# Patient Record
Sex: FEMALE | Race: WHITE | NOT HISPANIC OR LATINO | Employment: STUDENT | URBAN - METROPOLITAN AREA
[De-identification: names, ages, dates, MRNs, and addresses within clinical notes are randomized per-mention and may not be internally consistent; named-entity substitution may affect disease eponyms.]

---
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
## 2024-01-15 ENCOUNTER — OFFICE VISIT (OUTPATIENT)
Dept: OBGYN CLINIC | Facility: CLINIC | Age: 19
End: 2024-01-15
Payer: COMMERCIAL

## 2024-01-15 VITALS — DIASTOLIC BLOOD PRESSURE: 60 MMHG | SYSTOLIC BLOOD PRESSURE: 90 MMHG | WEIGHT: 103 LBS

## 2024-01-15 DIAGNOSIS — Z11.3 SCREENING EXAMINATION FOR STD (SEXUALLY TRANSMITTED DISEASE): Primary | ICD-10-CM

## 2024-01-15 PROCEDURE — 99202 OFFICE O/P NEW SF 15 MIN: CPT | Performed by: NURSE PRACTITIONER

## 2024-01-15 NOTE — PROGRESS NOTES
Katarina Diaz 18-year-old presents for sexually transmitted disease check. Sexual history reviewed with the patient. STD exposure: denies knowledge of risky exposure.  Previous history of STD:  none. Current symptoms include none.  Contraception: condoms. She is in a stable relationship and just desires screening out of precaution.     Patient's last menstrual period was 01/05/2024.    Visit Vitals  Wt 46.7 kg (103 lb)   LMP 01/05/2024   OB Status Having periods   Smoking Status Never     ROS:  As indicated in HPI. All other ROS negative.     Physical Exam  Constitutional:       Appearance: Normal appearance.   Genitourinary:      No lesions in the vagina.      Right Labia: No rash, tenderness, lesions, skin changes or Bartholin's cyst.     Left Labia: No tenderness, lesions, skin changes, Bartholin's cyst or rash.     No labial fusion noted.      Vaginal discharge (scant thin white discharge) present.      No vaginal erythema, tenderness, bleeding, ulceration or granulation tissue.      No vaginal prolapse present.     No vaginal atrophy present.     Cervix is nulliparous.      No cervical discharge, friability or lesion.   HENT:      Head: Normocephalic and atraumatic.   Cardiovascular:      Rate and Rhythm: Normal rate.   Pulmonary:      Effort: Pulmonary effort is normal.   Musculoskeletal:      Cervical back: Neck supple.   Neurological:      Mental Status: She is alert.   Skin:     General: Skin is warm.   Vitals and nursing note reviewed.       Katarina was seen today for exposure to std.    Diagnoses and all orders for this visit:    Screening examination for STD (sexually transmitted disease)  -     SURESWAB(R) ADVANCED VAGINITIS PLUS, TMA      Cultures collected for STD screening.   Results will be released to [facility], if abnormal will call to review and discuss treatment plan.

## 2024-01-16 LAB
BV BACTERIA RRNA VAG QL NAA+PROBE: POSITIVE
C GLABRATA RNA VAG QL NAA+PROBE: NOT DETECTED
C TRACH RRNA SPEC QL NAA+PROBE: NOT DETECTED
CANDIDA RRNA VAG QL PROBE: NOT DETECTED
N GONORRHOEA RRNA SPEC QL NAA+PROBE: NOT DETECTED
T VAGINALIS RRNA SPEC QL NAA+PROBE: NOT DETECTED

## 2024-01-17 DIAGNOSIS — N76.0 BV (BACTERIAL VAGINOSIS): Primary | ICD-10-CM

## 2024-01-17 DIAGNOSIS — B96.89 BV (BACTERIAL VAGINOSIS): Primary | ICD-10-CM

## 2024-01-17 RX ORDER — METRONIDAZOLE 500 MG/1
500 TABLET ORAL EVERY 12 HOURS SCHEDULED
Qty: 14 TABLET | Refills: 0 | Status: SHIPPED | OUTPATIENT
Start: 2024-01-17 | End: 2024-01-24

## 2025-01-20 ENCOUNTER — TELEPHONE (OUTPATIENT)
Age: 20
End: 2025-01-20

## 2025-01-20 NOTE — TELEPHONE ENCOUNTER
Called and LVM regarding online appt inquiry received. Provided office telephone number to assist with scheduling if pt is still in need of services.